# Patient Record
Sex: FEMALE | Race: BLACK OR AFRICAN AMERICAN | URBAN - METROPOLITAN AREA
[De-identification: names, ages, dates, MRNs, and addresses within clinical notes are randomized per-mention and may not be internally consistent; named-entity substitution may affect disease eponyms.]

---

## 2022-12-07 ENCOUNTER — APPOINTMENT (RX ONLY)
Dept: URBAN - METROPOLITAN AREA CLINIC 45 | Facility: CLINIC | Age: 42
Setting detail: DERMATOLOGY
End: 2022-12-07

## 2022-12-07 DIAGNOSIS — L70.0 ACNE VULGARIS: ICD-10-CM | Status: WELL CONTROLLED

## 2022-12-07 PROCEDURE — ? MEDICATION COUNSELING

## 2022-12-07 PROCEDURE — 99204 OFFICE O/P NEW MOD 45 MIN: CPT

## 2022-12-07 PROCEDURE — ? PRESCRIPTION

## 2022-12-07 PROCEDURE — ? COUNSELING

## 2022-12-07 PROCEDURE — ? ADDITIONAL NOTES

## 2022-12-07 PROCEDURE — ? PRESCRIPTION MEDICATION MANAGEMENT

## 2022-12-07 RX ORDER — ADAPALENE 1 MG/G
CREAM TOPICAL
Qty: 45 | Refills: 6 | Status: ERX | COMMUNITY
Start: 2022-12-07

## 2022-12-07 RX ORDER — CLINDAMYCIN PHOSPHATE AND BENZOYL PEROXIDE 12; 50 MG/G; MG/G
GEL TOPICAL
Qty: 45 | Refills: 5 | Status: ERX | COMMUNITY
Start: 2022-12-07

## 2022-12-07 RX ADMIN — CLINDAMYCIN PHOSPHATE AND BENZOYL PEROXIDE: 12; 50 GEL TOPICAL at 00:00

## 2022-12-07 RX ADMIN — ADAPALENE: 1 CREAM TOPICAL at 00:00

## 2022-12-07 ASSESSMENT — LOCATION DETAILED DESCRIPTION DERM: LOCATION DETAILED: INFERIOR MID FOREHEAD

## 2022-12-07 ASSESSMENT — LOCATION SIMPLE DESCRIPTION DERM: LOCATION SIMPLE: INFERIOR FOREHEAD

## 2022-12-07 ASSESSMENT — LOCATION ZONE DERM: LOCATION ZONE: FACE

## 2022-12-07 NOTE — PROCEDURE: COUNSELING
Topical Retinoid counseling:  Patient advised to apply a pea-sized amount only at bedtime and wait 30 minutes after washing their face before applying.  If too drying, patient may add a non-comedogenic moisturizer. The patient verbalized understanding of the proper use and possible adverse effects of retinoids.  All of the patient's questions and concerns were addressed.
Isotretinoin Counseling: Patient should get monthly blood tests, not donate blood, not drive at night if vision affected, not share medication, and not undergo elective surgery for 6 months after tx completed. Side effects reviewed, pt to contact office should one occur.
Sarecycline Pregnancy And Lactation Text: This medication is Pregnancy Category D and not consider safe during pregnancy. It is also excreted in breast milk.
Benzoyl Peroxide Counseling: Patient counseled that medicine may cause skin irritation and bleach clothing.  In the event of skin irritation, the patient was advised to reduce the amount of the drug applied or use it less frequently.   The patient verbalized understanding of the proper use and possible adverse effects of benzoyl peroxide.  All of the patient's questions and concerns were addressed.
Erythromycin Counseling:  I discussed with the patient the risks of erythromycin including but not limited to GI upset, allergic reaction, drug rash, diarrhea, increase in liver enzymes, and yeast infections.
Bactrim Pregnancy And Lactation Text: This medication is Pregnancy Category D and is known to cause fetal risk.  It is also excreted in breast milk.
Winlevi Pregnancy And Lactation Text: This medication is considered safe during pregnancy and breastfeeding.
Azithromycin Pregnancy And Lactation Text: This medication is considered safe during pregnancy and is also secreted in breast milk.
Include Pregnancy/Lactation Warning?: No
Azelaic Acid Counseling: Patient counseled that medicine may cause skin irritation and to avoid applying near the eyes.  In the event of skin irritation, the patient was advised to reduce the amount of the drug applied or use it less frequently.   The patient verbalized understanding of the proper use and possible adverse effects of azelaic acid.  All of the patient's questions and concerns were addressed.
Topical Sulfur Applications Pregnancy And Lactation Text: This medication is Pregnancy Category C and has an unknown safety profile during pregnancy. It is unknown if this topical medication is excreted in breast milk.
Doxycycline Counseling:  Patient counseled regarding possible photosensitivity and increased risk for sunburn.  Patient instructed to avoid sunlight, if possible.  When exposed to sunlight, patients should wear protective clothing, sunglasses, and sunscreen.  The patient was instructed to call the office immediately if the following severe adverse effects occur:  hearing changes, easy bruising/bleeding, severe headache, or vision changes.  The patient verbalized understanding of the proper use and possible adverse effects of doxycycline.  All of the patient's questions and concerns were addressed.
High Dose Vitamin A Pregnancy And Lactation Text: High dose vitamin A therapy is contraindicated during pregnancy and breast feeding.
Aklief counseling:  Patient advised to apply a pea-sized amount only at bedtime and wait 30 minutes after washing their face before applying.  If too drying, patient may add a non-comedogenic moisturizer.  The most commonly reported side effects including irritation, redness, scaling, dryness, stinging, burning, itching, and increased risk of sunburn.  The patient verbalized understanding of the proper use and possible adverse effects of retinoids.  All of the patient's questions and concerns were addressed.
Topical Clindamycin Pregnancy And Lactation Text: This medication is Pregnancy Category B and is considered safe during pregnancy. It is unknown if it is excreted in breast milk.
Tetracycline Counseling: Patient counseled regarding possible photosensitivity and increased risk for sunburn.  Patient instructed to avoid sunlight, if possible.  When exposed to sunlight, patients should wear protective clothing, sunglasses, and sunscreen.  The patient was instructed to call the office immediately if the following severe adverse effects occur:  hearing changes, easy bruising/bleeding, severe headache, or vision changes.  The patient verbalized understanding of the proper use and possible adverse effects of tetracycline.  All of the patient's questions and concerns were addressed. Patient understands to avoid pregnancy while on therapy due to potential birth defects.
Topical Retinoid Pregnancy And Lactation Text: This medication is Pregnancy Category C. It is unknown if this medication is excreted in breast milk.
Dapsone Counseling: I discussed with the patient the risks of dapsone including but not limited to hemolytic anemia, agranulocytosis, rashes, methemoglobinemia, kidney failure, peripheral neuropathy, headaches, GI upset, and liver toxicity.  Patients who start dapsone require monitoring including baseline LFTs and weekly CBCs for the first month, then every month thereafter.  The patient verbalized understanding of the proper use and possible adverse effects of dapsone.  All of the patient's questions and concerns were addressed.
Isotretinoin Pregnancy And Lactation Text: This medication is Pregnancy Category X and is considered extremely dangerous during pregnancy. It is unknown if it is excreted in breast milk.
Tazorac Pregnancy And Lactation Text: This medication is not safe during pregnancy. It is unknown if this medication is excreted in breast milk.
Erythromycin Pregnancy And Lactation Text: This medication is Pregnancy Category B and is considered safe during pregnancy. It is also excreted in breast milk.
Birth Control Pills Counseling: Birth Control Pill Counseling: I discussed with the patient the potential side effects of OCPs including but not limited to increased risk of stroke, heart attack, thrombophlebitis, deep venous thrombosis, hepatic adenomas, breast changes, GI upset, headaches, and depression.  The patient verbalized understanding of the proper use and possible adverse effects of OCPs. All of the patient's questions and concerns were addressed.
Spironolactone Counseling: Patient advised regarding risks of diarrhea, abdominal pain, hyperkalemia, birth defects (for female patients), liver toxicity and renal toxicity. The patient may need blood work to monitor liver and kidney function and potassium levels while on therapy. The patient verbalized understanding of the proper use and possible adverse effects of spironolactone.  All of the patient's questions and concerns were addressed.
Benzoyl Peroxide Pregnancy And Lactation Text: This medication is Pregnancy Category C. It is unknown if benzoyl peroxide is excreted in breast milk.
Sarecycline Counseling: Patient advised regarding possible photosensitivity and discoloration of the teeth, skin, lips, tongue and gums.  Patient instructed to avoid sunlight, if possible.  When exposed to sunlight, patients should wear protective clothing, sunglasses, and sunscreen.  The patient was instructed to call the office immediately if the following severe adverse effects occur:  hearing changes, easy bruising/bleeding, severe headache, or vision changes.  The patient verbalized understanding of the proper use and possible adverse effects of sarecycline.  All of the patient's questions and concerns were addressed.
Azelaic Acid Pregnancy And Lactation Text: This medication is considered safe during pregnancy and breast feeding.
Azithromycin Counseling:  I discussed with the patient the risks of azithromycin including but not limited to GI upset, allergic reaction, drug rash, diarrhea, and yeast infections.
Minocycline Counseling: Patient advised regarding possible photosensitivity and discoloration of the teeth, skin, lips, tongue and gums.  Patient instructed to avoid sunlight, if possible.  When exposed to sunlight, patients should wear protective clothing, sunglasses, and sunscreen.  The patient was instructed to call the office immediately if the following severe adverse effects occur:  hearing changes, easy bruising/bleeding, severe headache, or vision changes.  The patient verbalized understanding of the proper use and possible adverse effects of minocycline.  All of the patient's questions and concerns were addressed.
Doxycycline Pregnancy And Lactation Text: This medication is Pregnancy Category D and not consider safe during pregnancy. It is also excreted in breast milk but is considered safe for shorter treatment courses.
Bactrim Counseling:  I discussed with the patient the risks of sulfa antibiotics including but not limited to GI upset, allergic reaction, drug rash, diarrhea, dizziness, photosensitivity, and yeast infections.  Rarely, more serious reactions can occur including but not limited to aplastic anemia, agranulocytosis, methemoglobinemia, blood dyscrasias, liver or kidney failure, lung infiltrates or desquamative/blistering drug rashes.
Winlevi Counseling:  I discussed with the patient the risks of topical clascoterone including but not limited to erythema, scaling, itching, and stinging. Patient voiced their understanding.
Aklief Pregnancy And Lactation Text: It is unknown if this medication is safe to use during pregnancy.  It is unknown if this medication is excreted in breast milk.  Breastfeeding women should use the topical cream on the smallest area of the skin for the shortest time needed while breastfeeding.  Do not apply to nipple and areola.
Topical Sulfur Applications Counseling: Topical Sulfur Counseling: Patient counseled that this medication may cause skin irritation or allergic reactions.  In the event of skin irritation, the patient was advised to reduce the amount of the drug applied or use it less frequently.   The patient verbalized understanding of the proper use and possible adverse effects of topical sulfur application.  All of the patient's questions and concerns were addressed.
Tazorac Counseling:  Patient advised that medication is irritating and drying.  Patient may need to apply sparingly and wash off after an hour before eventually leaving it on overnight.  The patient verbalized understanding of the proper use and possible adverse effects of tazorac.  All of the patient's questions and concerns were addressed.
Dapsone Pregnancy And Lactation Text: This medication is Pregnancy Category C and is not considered safe during pregnancy or breast feeding.
High Dose Vitamin A Counseling: Side effects reviewed, pt to contact office should one occur.
Detail Level: Zone
Birth Control Pills Pregnancy And Lactation Text: This medication should be avoided if pregnant and for the first 30 days post-partum.
Spironolactone Pregnancy And Lactation Text: This medication can cause feminization of the male fetus and should be avoided during pregnancy. The active metabolite is also found in breast milk.
Topical Clindamycin Counseling: Patient counseled that this medication may cause skin irritation or allergic reactions.  In the event of skin irritation, the patient was advised to reduce the amount of the drug applied or use it less frequently.   The patient verbalized understanding of the proper use and possible adverse effects of clindamycin.  All of the patient's questions and concerns were addressed.

## 2022-12-07 NOTE — PROCEDURE: MEDICATION COUNSELING
No new care gaps identified.  [facility] Embedded Care Gaps. Reference number: [id number]. 6/23/2022   12:07:38 AM BRIANNA   Sotyktu Counseling:  I discussed the most common side effects of Sotyktu including: common cold, sore throat, sinus infections, cold sores, canker sores, folliculitis, and acne.  I also discussed more serious side effects of Sotyktu including but not limited to: serious allergic reactions; increased risk for infections such as TB; cancers such as lymphomas; rhabdomyolysis and elevated CPK; and elevated triglycerides and liver enzymes.

## 2022-12-07 NOTE — PROCEDURE: MEDICATION COUNSELING
Well Teen Visit at 15-17 Years Handout for Parents   WHAT YOU NEED TO KNOW:   What is a well teen visit? A well teen visit is when your teen sees a healthcare provider to prevent health problems  It is a different type of visit than when your teen sees a healthcare provider because he is sick  Well teen visits are used to track your teen's growth and development  It is also a time for you to ask questions and to get information on how to keep your teen safe  Write down your questions so you remember to ask them  Your teen should have regular well teen visits from birth to 16 years  What development milestones may my teen reach at 13 to 16 years? Every teen develops at his own pace  Your teen might have already reached the following milestones, or he may reach them later:  · Menstruation by 18 years for girls    · Start driving    · Develop a desire to have sex, start dating, and identify sexual orientation    · Start working or planning for [employer]  What can I do to help my teen get the right nutrition? · Teach your teen about a healthy meal plan by setting a good example  Your teen still learns from your eating habits  Buy healthy foods for your family  Eat healthy meals together as a family as often as possible  Talk with your teen about why it is important to choose healthy foods  · Encourage your teen to eat regular meals and snacks, even if he is busy  He should eat 3 meals and 2 snacks each day to help meet his calorie needs  He should also eat a variety of healthy foods to get the nutrients he needs, and to maintain a healthy weight  You may need to help your teen plan his meals and snacks  Suggest healthy food choices that your teen can make when he eats out  He could order a chicken sandwich instead of a large burger or choose a side salad instead of [place] fries  Praise your teen's good food choices whenever you can  · Provide a variety of fruits and vegetables    Half of your teen's plate should contain fruits and vegetables  He should eat about 5 servings of fruits and vegetables each day  Buy fresh, canned, or dried fruit instead of fruit juice as often as possible  Offer more dark green, red, and orange vegetables  Dark green vegetables include broccoli, spinach, jenelle lettuce, and marco greens  Examples of orange and red vegetables are carrots, sweet potatoes, winter squash, and red peppers  · Provide whole grain foods  Half of the grains your teen eats each day should be whole grains  Whole grains include brown rice, whole wheat pasta, and whole grain cereals and breads  · Provide low-fat dairy foods  Dairy foods are a good source of calcium  Your teen needs 1300 milligrams (mg) of calcium each day  Dairy foods include milk, cheese, cottage cheese, and yogurt  · Provide lean meats, poultry, fish, and other healthy protein foods  Other healthy protein foods include legumes (such as beans), soy foods (such as tofu), and peanut butter  Bake, broil, and grill meat instead of frying it to reduce the amount of fat  · Use healthy fats to prepare your teen's food  Unsaturated fat is a healthy fat  It is found in foods such as soybean, canola, olive, and sunflower oils  It is also found in soft tub margarine that is made with liquid vegetable oil  Limit unhealthy fats such as saturated fat, trans fat, and cholesterol  These are found in shortening, butter, margarine, and animal fat  · Help your teen limit his intake of fat, sugar, and caffeine  Foods high in fat and sugar include snack foods (potato chips, candy, and other sweets), juice, fruit drinks, and soda  If your teen eats these foods too often, he may eat fewer healthy foods during mealtimes  He may also gain too much weight  Caffeine is found in soft drinks, energy drinks, tea, coffee, and some over-the-counter medicines  Your teen should limit his intake of caffeine to 100 mg or less each day  Caffeine can cause your teen to feel jittery, anxious, or dizzy  It can also cause headaches and trouble sleeping  · Encourage your teen to talk to you or a healthcare provider about safe weight loss, if needed  Adolescents may want to follow a fad diet if they see their friends or famous people following such a diet  Fad diets usually do not have all the nutrients your teen needs to grow and stay healthy  Diets may also lead to eating disorders such as anorexia and bulimia  Anorexia is refusal to eat  Bulimia is binge eating followed by vomiting, using laxative medicine, not eating at all, or heavy exercise  What can I do to keep my teen safe? · Encourage your teen to do safe and healthy activities  Encourage your teen to play sports or join an after school program  Kady Dong can also encourage your teen to volunteer in the community  Volunteer with your teen if possible  · Create strict rules for driving  Do not let your teen drink and drive  Explain that it is unsafe and illegal to drink and drive  Encourage your teen to wear his seat belt  Also encourage him to make other people in his car wear their seat belts  Set limits for the number of people your teen can have in the car, and limit his driving at night  Encourage your teen not to use his phone to talk or text while driving  · Store and lock all weapons  Lock ammunition in a separate place  Do not show or tell your teen where you keep the key  Make sure all guns are unloaded before you store them  · Teach your teen how to deal with conflict without using violence  Encourage your teen not to get into fights or bully anyone  Explain other ways he can solve conflicts  · Encourage your teen to use safety equipment  Encourage him to wear helmets, protective sports gear, and life jackets  What can I do to support my teen? · Praise your teen for good behavior  Do this any time he does well in school or makes safe and healthy choices  · Encourage your teen to get 1 hour of physical activity each day  Examples of physical activities include sports, running, walking, swimming, and riding bikes  The hour of physical activity does not need to be done all at once  It can be done in shorter blocks of time  Your teen can fit in more physical activity by limiting the amount of time he spends watching television or on the computer  · Monitor your teen's progress at school  Go to [facility]  Ask your teen to let you see his report card  · Help your teen solve problems and make decisions  Ask your teen about any problems or concerns that he has  Make time to listen to your teen's hopes and concerns  Find ways to help him work through problems and make healthy decisions  Help your teen set goals for school, other activities, and his future  · Help your teen find ways to deal with stress  Be a good example of how to handle stress  Help your teen find activities that help him manage stress  Examples include exercising, reading, or listening to music  Encourage your child to talk to you when he is feeling stressed, sad, angry, hopeless, or depressed  · Encourage your teen to create healthy relationships  Know your teen's friends and their parents  Know where your teen is and what he is doing at all times  Help your teen and his friends find fun and safe activities to do  Talk with your teen about healthy dating relationships  Tell them it is okay to say "no" and to respect when someone else tells him "no "  How should I talk to my teen about sex, drugs, tobacco, and alcohol? · Be prepared to talk about these issues  Read about these subjects so you can answer your teen's questions  Ask your teen's healthcare provider where you can get more information  · Encourage your teen not to take drugs, or use tobacco or alcohol  Explain that these substances are dangerous and that you care about their health   Also explain that drugs and alcohol are illegal      · Encourage your teen never to get in a car with someone who has used drugs or alcohol  Tell him that he can call you if he needs a ride  · Encourage your teen to make healthy decisions about sexual behavior  Encourage your teen to practice abstinence  Abstinence means not having sex  If your teen chooses to have sex, encourage the use of condoms or barrier methods  Explain that condoms and barriers prevent sexually transmitted infections and pregnancy  · Encourage your teen to ask questions  Make time to listen to your teen's questions and concerns about sex, drugs, alcohol, and tobacco      · Get your teen vaccinated  Vaccines may decrease your teen's risk for some STIs  Your teen should get vaccinated against hepatitis B and the human papilloma virus (HPV)  Ask your teen's healthcare provider for more information on vaccines for STIs  · Get more information  For more information about how to talk to your teen you can visit the followin [employer]  [facility]/How to talk to your teen about sex  Phone: 2- 035 - [phone]  Web Address: [employer]/English/ages-stages/teen/dating-sex/Pages/Ial-jq-Rxeh-About-Sex-With-Your-Teen  aspx  ¨ [employer]/Talk to your Teen about Drugs and Alcohol  Phone: 3- 545 - 204-3988  Web Address: [employer]/English/ages-stages/teen/substance-abuse/Pages/Talking-to-Teens-About-Drugs-and-Alcohol  aspx  What medical care happens next for my teen? Your teen's healthcare provider will talk to you about where your teen should go for medical care after 17 years  Your teen may continue to see the same healthcare providers until he is 26years old  CARE AGREEMENT:   You have the right to help plan your child's care  Learn about your child's health condition and how it may be treated  Discuss treatment options with your child's caregivers to decide what care you want for your child   The above information is an  only  It is not intended as medical advice for individual conditions or treatments  Talk to your doctor, nurse or pharmacist before following any medical regimen to see if it is safe and effective for you  © 2017 [address] [employer] Information is for End User's use only and may not be sold, redistributed or otherwise used for commercial purposes  All illustrations and images included in CareNotes® are the copyrighted property of A D A M , Inc  or [employer]  Opzelura Pregnancy And Lactation Text: There is insufficient data to evaluate drug-associated risk for major birth defects, miscarriage, or other adverse maternal or fetal outcomes.  There is a pregnancy registry that monitors pregnancy outcomes in pregnant persons exposed to the medication during pregnancy.  It is unknown if this medication is excreted in breast milk.  Do not breastfeed during treatment and for about 4 weeks after the last dose.

## 2022-12-07 NOTE — PROCEDURE: PRESCRIPTION MEDICATION MANAGEMENT
Initiate Treatment: Adapalene cream \\nClindamycin / BP gel
Render In Strict Bullet Format?: No
Detail Level: Zone
Plan: Cetaphil for oily skin \\nSpf 30+ \\nOC8 matte gel

## 2022-12-07 NOTE — PROCEDURE: MEDICATION COUNSELING
Xelevangelinaz Pregnancy And Lactation Text: This medication is Pregnancy Category D and is not considered safe during pregnancy.  The risk during breast feeding is also uncertain.

## 2022-12-07 NOTE — HPI: PIMPLES (ACNE)
What Type Of Note Output Would You Prefer (Optional)?: Bullet Format
How Severe Is Your Acne?: mild
Is This A New Presentation, Or A Follow-Up?: Acne
Additional Comments (Use Complete Sentences): New pt here to establish care and get refills of adapalene cream and clindamycin cream for acne treatment. [place] happy with [place]

## 2022-12-09 ENCOUNTER — RX ONLY (OUTPATIENT)
Age: 42
Setting detail: RX ONLY
End: 2022-12-09

## 2022-12-09 RX ORDER — ADAPALENE 1 MG/G
CREAM TOPICAL
Qty: 45 | Refills: 6 | Status: ACTIVE

## 2022-12-14 ENCOUNTER — RX ONLY (OUTPATIENT)
Age: 42
Setting detail: RX ONLY
End: 2022-12-14

## 2022-12-14 RX ORDER — TRETIONIN 0.25 MG/G
CREAM TOPICAL
Qty: 45 | Refills: 0 | Status: ERX | COMMUNITY
Start: 2022-12-14

## 2023-02-13 ENCOUNTER — WEB ENCOUNTER (OUTPATIENT)
Dept: URBAN - METROPOLITAN AREA CLINIC 35 | Facility: CLINIC | Age: 43
End: 2023-02-13

## 2023-02-14 ENCOUNTER — LAB OUTSIDE AN ENCOUNTER (OUTPATIENT)
Dept: URBAN - METROPOLITAN AREA CLINIC 35 | Facility: CLINIC | Age: 43
End: 2023-02-14

## 2023-02-14 ENCOUNTER — OFFICE VISIT (OUTPATIENT)
Dept: URBAN - METROPOLITAN AREA CLINIC 35 | Facility: CLINIC | Age: 43
End: 2023-02-14
Payer: COMMERCIAL

## 2023-02-14 ENCOUNTER — TELEPHONE ENCOUNTER (OUTPATIENT)
Dept: URBAN - METROPOLITAN AREA CLINIC 36 | Facility: CLINIC | Age: 43
End: 2023-02-14

## 2023-02-14 VITALS
SYSTOLIC BLOOD PRESSURE: 100 MMHG | BODY MASS INDEX: 23.7 KG/M2 | OXYGEN SATURATION: 100 % | WEIGHT: 151 LBS | HEART RATE: 46 BPM | HEIGHT: 67 IN | DIASTOLIC BLOOD PRESSURE: 64 MMHG

## 2023-02-14 DIAGNOSIS — R07.89 ATYPICAL CHEST PAIN: ICD-10-CM

## 2023-02-14 DIAGNOSIS — R12 HEARTBURN: ICD-10-CM

## 2023-02-14 DIAGNOSIS — K21.9 GASTROESOPHAGEAL REFLUX DISEASE, UNSPECIFIED WHETHER ESOPHAGITIS PRESENT: ICD-10-CM

## 2023-02-14 PROBLEM — 235595009: Status: ACTIVE | Noted: 2023-02-14

## 2023-02-14 PROCEDURE — 99243 OFF/OP CNSLTJ NEW/EST LOW 30: CPT | Performed by: INTERNAL MEDICINE

## 2023-02-14 PROCEDURE — 99203 OFFICE O/P NEW LOW 30 MIN: CPT | Performed by: INTERNAL MEDICINE

## 2023-02-14 NOTE — PHYSICAL EXAM CHEST:
no lesions,  no deformities,  chest wall non-tender,  breathing is unlabored without accessory muscle use, normal breath sounds

## 2023-02-14 NOTE — PHYSICAL EXAM NECK/THYROID:
normal appearance , without tenderness upon palpation , no deformities , trachea midline , Thyroid normal size  no masses ,  thyroid non tender

## 2023-02-14 NOTE — HPI-SCREENING
Patient admits longstanding history (19 years) of heartburn/reflux. Symptoms include/are described as severe chest pain and is located central aspect of her chest.   Patient states she is currently taking Protonix 40 mg with some relief of symptoms.  Patient denies dysphagia, globus, sour eructations, bloating/gas, indigestion, early satiety, changes in appetite, coughing, abdominal/epigastric pain, or changes in bowel habits. Patient states that she has not had any recent changes in their medications. Admits a family history of esophageal and gastric cancers and/or diseases. Patient has had an EGD or Barium Swallow Test before. Barium Swallow Test was performed eight to nine years ago and revealed normal results.  GI MD: patient restarted Pantoprazole in 2020; she has taken it previously on and off. If she does not take her Pantoprazole, has burning discomfort in chest, ?esophageal spasms, globus, heartburn. No dysphagia No SOB, cough, hoarseness No abdominal pain NL BM's

## 2023-04-27 ENCOUNTER — OFFICE VISIT (OUTPATIENT)
Dept: URBAN - METROPOLITAN AREA SURGERY CENTER 8 | Facility: SURGERY CENTER | Age: 43
End: 2023-04-27

## 2023-05-16 ENCOUNTER — OFFICE VISIT (OUTPATIENT)
Dept: URBAN - METROPOLITAN AREA CLINIC 35 | Facility: CLINIC | Age: 43
End: 2023-05-16

## 2023-06-04 ENCOUNTER — WEB ENCOUNTER (OUTPATIENT)
Dept: URBAN - METROPOLITAN AREA SURGERY CENTER 8 | Facility: SURGERY CENTER | Age: 43
End: 2023-06-04

## 2023-06-08 ENCOUNTER — CLAIMS CREATED FROM THE CLAIM WINDOW (OUTPATIENT)
Dept: URBAN - METROPOLITAN AREA CLINIC 4 | Facility: CLINIC | Age: 43
End: 2023-06-08
Payer: COMMERCIAL

## 2023-06-08 ENCOUNTER — OFFICE VISIT (OUTPATIENT)
Dept: URBAN - METROPOLITAN AREA SURGERY CENTER 8 | Facility: SURGERY CENTER | Age: 43
End: 2023-06-08
Payer: COMMERCIAL

## 2023-06-08 DIAGNOSIS — K29.60 OTHER GASTRITIS WITHOUT BLEEDING: ICD-10-CM

## 2023-06-08 DIAGNOSIS — R12 BURNING REFLUX: ICD-10-CM

## 2023-06-08 DIAGNOSIS — K31.89 OTHER DISEASES OF STOMACH AND DUODENUM: ICD-10-CM

## 2023-06-08 DIAGNOSIS — R07.89 ATYPICAL CHEST PAIN: ICD-10-CM

## 2023-06-08 DIAGNOSIS — K31.89 ACQUIRED DEFORMITY OF DUODENUM: ICD-10-CM

## 2023-06-08 DIAGNOSIS — K21.9 GASTROESOPHAGEAL REFLUX DISEASE: ICD-10-CM

## 2023-06-08 DIAGNOSIS — K31.7 SOLITARY FUNDIC GLAND POLYP: ICD-10-CM

## 2023-06-08 DIAGNOSIS — K29.70 CHRONIC ACITVE GASTRITIS (H.PYLORI NEGATIVE): ICD-10-CM

## 2023-06-08 DIAGNOSIS — K31.7 BENIGN GASTRIC POLYP: ICD-10-CM

## 2023-06-08 PROCEDURE — 00731 ANES UPR GI NDSC PX NOS: CPT | Performed by: NURSE ANESTHETIST, CERTIFIED REGISTERED

## 2023-06-08 PROCEDURE — G8907 PT DOC NO EVENTS ON DISCHARG: HCPCS | Performed by: INTERNAL MEDICINE

## 2023-06-08 PROCEDURE — 88342 IMHCHEM/IMCYTCHM 1ST ANTB: CPT | Performed by: PATHOLOGY

## 2023-06-08 PROCEDURE — 43239 EGD BIOPSY SINGLE/MULTIPLE: CPT | Performed by: INTERNAL MEDICINE

## 2023-06-08 PROCEDURE — 88305 TISSUE EXAM BY PATHOLOGIST: CPT | Performed by: PATHOLOGY

## 2023-06-28 ENCOUNTER — DASHBOARD ENCOUNTERS (OUTPATIENT)
Age: 43
End: 2023-06-28

## 2023-06-28 ENCOUNTER — OFFICE VISIT (OUTPATIENT)
Dept: URBAN - METROPOLITAN AREA CLINIC 35 | Facility: CLINIC | Age: 43
End: 2023-06-28
Payer: COMMERCIAL

## 2023-06-28 VITALS
SYSTOLIC BLOOD PRESSURE: 136 MMHG | HEIGHT: 67 IN | HEART RATE: 51 BPM | BODY MASS INDEX: 24.33 KG/M2 | DIASTOLIC BLOOD PRESSURE: 78 MMHG | WEIGHT: 155 LBS

## 2023-06-28 DIAGNOSIS — R12 HEARTBURN: ICD-10-CM

## 2023-06-28 DIAGNOSIS — K21.9 GASTROESOPHAGEAL REFLUX DISEASE, UNSPECIFIED WHETHER ESOPHAGITIS PRESENT: ICD-10-CM

## 2023-06-28 DIAGNOSIS — R07.89 ATYPICAL CHEST PAIN: ICD-10-CM

## 2023-06-28 PROCEDURE — 99214 OFFICE O/P EST MOD 30 MIN: CPT | Performed by: INTERNAL MEDICINE

## 2023-06-28 RX ORDER — FAMOTIDINE 40 MG/1
1 TABLET TABLET, FILM COATED ORAL
Qty: 90 TABLET | Refills: 0 | OUTPATIENT
Start: 2023-06-28

## 2023-06-28 RX ORDER — PANTOPRAZOLE SODIUM 40 MG/1
1 TABLET TABLET, DELAYED RELEASE ORAL ONCE A DAY
Status: ACTIVE | COMMUNITY

## 2023-06-28 RX ORDER — RABEPRAZOLE SODIUM 20 MG/1
1 TABLET TABLET, DELAYED RELEASE ORAL
Qty: 90 | Refills: 0 | OUTPATIENT
Start: 2023-06-28

## 2023-06-28 NOTE — HPI-ENDOSCOPY (EGD) FOLLOWUP
42  Year old female patient presents today for a follow up from her endoscopy. She denies any complications after her procedure. Since her procedure she denies any episodes of dysphagia, globus, a change in appetite or bowel habits.  Imression Report:   Z-line regular, 38 cm from the incisors. - Small sliding type hiatal hernia. - Normal mucosa was found in the entire esophagus.  Biopsied. - Erythematous mucosa in the stomach.  Biopsied. - A few gastric polyps.  One polyp resected and retrieved. - Normal examined duodenum.  Biopsied.   Pathology Report:  A) Duodenum, Biopsy: NO SIGNIFICANT ABNORMALITY.  (B) Stomach, Antrum and Body, Biopsy: CHRONIC GASTRITIS, NON-SPECIFIC.  PROTON PUMP INHIBITOR EFFECT.  No Evidence of H. Pylori Organisms or Intestinal Metaplasia.  Negative For Dysplasia or Malignancy.  (C) Esophagus, Lower-Third, Biopsy: SQUAMOUS MUCOSA WITHOUT SIGNIFICANT ABNORMALITY; NO COLUMNAR                MUCOSA IDENTIFIED.  No Evidence of Infectious Organisms or Eosinophilic Esophagitis.  Negative For Dysplasia or Malignancy.  (D) Stomach, Body, Polypectomy: FUNDIC GLAND POLYP.  No Evidence of H. Pylori Organisms or Intestinal Metaplasia.  Negative for Dysplasia or Malignancy.

## 2023-07-12 ENCOUNTER — TELEPHONE ENCOUNTER (OUTPATIENT)
Dept: URBAN - METROPOLITAN AREA CLINIC 35 | Facility: CLINIC | Age: 43
End: 2023-07-12

## 2023-10-13 ENCOUNTER — OFFICE VISIT (OUTPATIENT)
Dept: URBAN - METROPOLITAN AREA CLINIC 31 | Facility: CLINIC | Age: 43
End: 2023-10-13

## 2023-10-13 RX ORDER — PANTOPRAZOLE SODIUM 40 MG/1
1 TABLET TABLET, DELAYED RELEASE ORAL ONCE A DAY
Status: ACTIVE | COMMUNITY

## 2023-10-13 RX ORDER — FAMOTIDINE 40 MG/1
1 TABLET TABLET, FILM COATED ORAL
Qty: 90 TABLET | Refills: 0 | Status: ACTIVE | COMMUNITY
Start: 2023-06-28

## 2023-10-13 RX ORDER — RABEPRAZOLE SODIUM 20 MG/1
1 TABLET TABLET, DELAYED RELEASE ORAL
Qty: 90 | Refills: 0 | Status: ACTIVE | COMMUNITY
Start: 2023-06-28

## 2023-10-13 NOTE — HPI-TODAY'S VISIT:
43 year old patient presents today for a three month EGD follow-up.   Impression Report:  - Z-line regular, 38 cm from the incisors. - Small sliding type hiatal hernia .- Normal mucosa was found in the entire esophagus.  Biopsied. - Erythematous mucosa in the stomach.  Biopsied. - A few gastric polyps.  One polyp resected and retrieved. - Normal examined duodenum.  Biopsied   Pathology Report:  (A) Duodenum, Biopsy:NO SIGNIFICANT ABNORMALITY.  (B) Stomach, Antrum and Body, Biopsy:CHRONIC GASTRITIS, NON-SPECIFIC. PROTON PUMP INHIBITOR EFFECT. No Evidence of H. Pylori Organisms or Intestinal Metaplasia. Negative For Dysplasia or Malignancy.  (C) Esophagus, Lower-Third, Biopsy:SQUAMOUS MUCOSA WITHOUT SIGNIFICANT ABNORMALITY; NO COLUMNAR               MUCOSA IDENTIFIED. No Evidence of Infectious Organisms or Eosinophilic Esophagitis. Negative For Dysplasia or Malignancy.  (D) Stomach, Body, Polypectomy:FUNDIC GLAND POLYP. No Evidence of H. Pylori Organisms or Intestinal Metaplasia. Negative for Dysplasia or Malignancy.

## 2023-10-24 ENCOUNTER — OFFICE VISIT (OUTPATIENT)
Dept: URBAN - METROPOLITAN AREA CLINIC 35 | Facility: CLINIC | Age: 43
End: 2023-10-24

## 2023-12-26 ENCOUNTER — TELEPHONE ENCOUNTER (OUTPATIENT)
Dept: URBAN - METROPOLITAN AREA CLINIC 35 | Facility: CLINIC | Age: 43
End: 2023-12-26

## 2023-12-26 ENCOUNTER — OFFICE VISIT (OUTPATIENT)
Dept: URBAN - METROPOLITAN AREA CLINIC 35 | Facility: CLINIC | Age: 43
End: 2023-12-26

## 2023-12-26 RX ORDER — FAMOTIDINE 40 MG/1
1 TABLET TABLET, FILM COATED ORAL
Qty: 90 TABLET | Refills: 0 | Status: ACTIVE | COMMUNITY
Start: 2023-06-28

## 2023-12-26 RX ORDER — RABEPRAZOLE SODIUM 20 MG/1
1 TABLET TABLET, DELAYED RELEASE ORAL
Qty: 90 | Refills: 0 | Status: ACTIVE | COMMUNITY
Start: 2023-06-28

## 2023-12-26 RX ORDER — PANTOPRAZOLE SODIUM 40 MG/1
1 TABLET TABLET, DELAYED RELEASE ORAL ONCE A DAY
Status: ACTIVE | COMMUNITY

## 2024-01-23 ENCOUNTER — OFFICE VISIT (OUTPATIENT)
Dept: URBAN - METROPOLITAN AREA CLINIC 35 | Facility: CLINIC | Age: 44
End: 2024-01-23

## 2024-01-23 NOTE — HPI-FOLLOW UP VISIT
43 year old female patient  with previous history of GERD presents today for her 7 month follow up.     Patient admits no change/worsening/improvement/relief of symptoms with (medication) as prescribed.     Admits any aggravating factors: __   Admits any alleviating factors: __    Patient currently  admits __ bowel movements per day with __ consistency with/without melena, blood, or mucus.    Last Appointment   Famotidine 40 mg QD  Rabeprazole 20 mg QD

## 2024-10-30 NOTE — PROCEDURE: MEDICATION COUNSELING
162.56 Dapsone Pregnancy And Lactation Text: This medication is Pregnancy Category C and is not considered safe during pregnancy or breast feeding.